# Patient Record
Sex: MALE | Race: WHITE | Employment: FULL TIME | URBAN - METROPOLITAN AREA
[De-identification: names, ages, dates, MRNs, and addresses within clinical notes are randomized per-mention and may not be internally consistent; named-entity substitution may affect disease eponyms.]

---

## 2022-01-11 ENCOUNTER — HOSPITAL ENCOUNTER (EMERGENCY)
Age: 72
Discharge: HOME | End: 2022-01-11
Attending: STUDENT IN AN ORGANIZED HEALTH CARE EDUCATION/TRAINING PROGRAM
Payer: COMMERCIAL

## 2022-01-11 VITALS
WEIGHT: 205 LBS | DIASTOLIC BLOOD PRESSURE: 74 MMHG | TEMPERATURE: 98.5 F | SYSTOLIC BLOOD PRESSURE: 132 MMHG | OXYGEN SATURATION: 97 % | RESPIRATION RATE: 18 BRPM | BODY MASS INDEX: 27.17 KG/M2 | HEIGHT: 73 IN | HEART RATE: 78 BPM

## 2022-01-11 DIAGNOSIS — U07.1 COVID: Primary | ICD-10-CM

## 2022-01-11 PROCEDURE — 74011000258 HC RX REV CODE- 258: Performed by: PHYSICIAN ASSISTANT

## 2022-01-11 PROCEDURE — 74011000636 HC RX REV CODE- 636: Performed by: PHYSICIAN ASSISTANT

## 2022-01-11 PROCEDURE — M0243 CASIRIVI AND IMDEVI INFUSION: HCPCS

## 2022-01-11 PROCEDURE — 99282 EMERGENCY DEPT VISIT SF MDM: CPT

## 2022-01-11 RX ORDER — GLIPIZIDE 10 MG/1
10 TABLET, FILM COATED, EXTENDED RELEASE ORAL 2 TIMES DAILY
COMMUNITY

## 2022-01-11 RX ORDER — METFORMIN HYDROCHLORIDE 850 MG/1
1000 TABLET ORAL 2 TIMES DAILY WITH MEALS
COMMUNITY
End: 2022-05-04 | Stop reason: CLARIF

## 2022-01-11 RX ORDER — PIOGLITAZONEHYDROCHLORIDE 15 MG/1
30 TABLET ORAL DAILY
COMMUNITY

## 2022-01-11 RX ADMIN — CASIRIVIMAB AND IMDEVIMAB 1200 MG: 600; 600 INJECTION, SOLUTION, CONCENTRATE INTRAVENOUS at 15:54

## 2022-01-11 NOTE — ED NOTES
Patient is Alert and oriented x 4. Patient stated that he was positive for COVID on 1/11/2022 at his PCP office. Patient complains of having shortness of breath, sore throat, and generalized body weakness. Patient appears well. NAD noted. Patient is here for infusion.

## 2022-01-11 NOTE — ED PROVIDER NOTES
[facility] EMERGENCY DEPT    Patient Name: Gita Anton    History of Presenting Illness     Chief Complaint   Patient presents with    Positive For Covid-19    IV Med     70 y.o. male presents to the ED requesting the monoclonal antibody. Patient states he developed symptoms 3 days ago, was tested positive by his doctor today. He notes having a sore throat as well as intermittent headaches. Patient has taken over-the-counter Tylenol with some improvement of his symptoms. Denies any fever, chills, cough, shortness of breath, other symptoms. Patient denies any other associated signs or symptoms. Patient denies any other complaints. Nursing notes regarding the HPI and triage nursing notes were reviewed. Prior medical records were reviewed. Current Outpatient Medications   Medication Sig Dispense Refill    pioglitazone (ACTOS) 15 mg tablet Take 15 mg by mouth daily.  metFORMIN (GLUCOPHAGE) 850 mg tablet Take 850 mg by mouth two (2) times daily (with meals). 1 tab in morning,  2 tabs in afternoon      glipiZIDE SR (GLUCOTROL XL) 10 mg CR tablet Take 10 mg by mouth two (2) times a day.  magic mouthwash (ROSE) susp Take 5 mL by mouth every four (4) hours as needed for Pain. Equal parts Maalox, lidocaine, Benadryl. 120 mL 0    amLODIPine-benazepril (LOTREL) 10-20 mg per capsule Take 1 Cap by mouth daily. 30 Cap 12    pantoprazole (PROTONIX) 40 mg tablet Take 40 mg by mouth daily.  metoprolol-XL (TOPROL XL) 50 mg XL tablet Take  by mouth daily.  aspirin delayed-release 81 mg tablet Take  by mouth daily.  omega-3 fatty acids (FISH OIL) cap Take  by mouth two (2) times a day.          Past History     Past Medical History:  Past Medical History:   Diagnosis Date    Cancer [facility]) 2006    prostate removed    Diabetes ([facility] [address]     Essential hypertension     Hypertension     Murmur     Murmur     Sciatica        Past Surgical History:  Past Surgical History: Procedure Laterality Date    HX KNEE ARTHROSCOPY      HX PARTIAL THYROIDECTOMY         Family History:  Family History   Problem Relation Age of Onset    Hypertension Mother     Hypertension Father     Diabetes Father     Hypertension Brother     Diabetes Maternal Grandmother        Social History:  Social History     Tobacco Use    Smoking status: Former Smoker     Packs/day: 0.50     Years: 5.00     Pack years: 2.50     Quit date: 3/13/1971     Years since quittin.8    Smokeless tobacco: Never Used   Substance Use Topics    Alcohol use: Yes     Alcohol/week: 0.8 standard drinks     Types: 1 Standard drinks or equivalent per week    Drug use: Never       Allergies:  No Known Allergies    Patient's primary care provider (as noted in EPIC):  Ortega Cheatham MD    Review of Systems   Constitutional:  Denies malaise, fever, chills. ENMT:  +sore throat. Cardiac:  Denies chest pain or palpitations. Respiratory:  Denies wheezing, difficulty breathing, shortness of breath. GI/ABD:  Denies injury, pain, distention, nausea, vomiting, diarrhea. Neuro: + headaches. Denies LOC, dizziness, neurologic symptoms/deficits/paresthesias. Skin: Denies injury, rash, itching or skin changes. All other systems negative as reviewed. Visit Vitals  /74   Pulse 78   Temp 98.5 °F (36.9 °C)   Resp 18   Ht 6' 1\" (1.854 m)   Wt 93 kg (205 lb)   SpO2 97%   BMI 27.05 kg/m²       PHYSICAL EXAM:    CONSTITUTIONAL:  Alert, in no apparent distress;  well developed;  well nourished. HEAD:  Normocephalic, atraumatic. EYES:  EOMI. Non-icteric sclera. Normal conjunctiva. ENTM:  Nose:  no rhinorrhea. Mouth: Mild erythema, without edema or exudate. Uvula midline, airway patent. NECK:  Supple. RESPIRATORY:  Chest clear, equal breath sounds, good air movement. Without wheezes, rhonchi or rales. CARDIOVASCULAR:  Regular rate and rhythm. No murmurs, rubs, or gallops.   GI:  Normal bowel sounds, abdomen soft and non-tender. No rebound or guarding. BACK:  Non-tender. NEURO:  Moves all four extremities, and grossly normal motor exam.  SKIN:  No rashes;  Normal for age. PSYCH:  Alert and normal affect. MEDICAL DECISION MAKING:  Patient given monoclonal antibody. He states that he has a sore throat, Rx provided for Magic mouthwash. He will take Tylenol, drink plenty of water, follow-up with his primary care doctor, return for any acute worsening including hemoptysis or shortness of breath. Diagnosis:   1. COVID      Disposition: Discharge    Follow-up Information     Follow up With Specialties Details Why Contact Info    Rajeev Shaikh MD Internal Medicine In 3 days  [place] [address] [address] [place] [address] [id number] [place] [address]      [id number] [place] [address] EMERGENCY DEPT Emergency Medicine  If symptoms worsen [address] [address]  [phone]          Patient's Medications   Start Taking    MAGIC MOUTHWASH Kiara Donaldson) SUSP    Take 5 mL by mouth every four (4) hours as needed for Pain. Equal parts Maalox, lidocaine, Benadryl. Continue Taking    AMLODIPINE-BENAZEPRIL (LOTREL) 10-20 MG PER CAPSULE    Take 1 Cap by mouth daily. ASPIRIN DELAYED-RELEASE 81 MG TABLET    Take  by mouth daily. GLIPIZIDE SR (GLUCOTROL XL) 10 MG CR TABLET    Take 10 mg by mouth two (2) times a day. METFORMIN (GLUCOPHAGE) 850 MG TABLET    Take 850 mg by mouth two (2) times daily (with meals). 1 tab in morning,  2 tabs in afternoon    METOPROLOL-XL (TOPROL XL) 50 MG XL TABLET    Take  by mouth daily. OMEGA-3 FATTY ACIDS (FISH OIL) CAP    Take  by mouth two (2) times a day. PANTOPRAZOLE (PROTONIX) 40 MG TABLET    Take 40 mg by mouth daily. PIOGLITAZONE (ACTOS) 15 MG TABLET    Take 15 mg by mouth daily. These Medications have changed    No medications on file   Stop Taking    METFORMIN (GLUCOPHAGE) 500 MG TABLET    Take 500 mg by mouth three (3) times daily (with meals).        Mariluz PROCTOR Partha Hickey [place]

## 2022-01-11 NOTE — ED NOTES
Patient remains A&Ox4 resp even and unlabored, NAD noted or indicated. VSS Rn to continue to monitor and maintain safety. Regen-COV given.

## 2022-01-11 NOTE — ED NOTES
Patient remains A&Ox4 resp even and unlabored, NAD noted or indicated. VSS Rn to continue to monitor and maintain safety.      Infusion completed

## 2022-01-12 ENCOUNTER — PATIENT OUTREACH (OUTPATIENT)
Dept: CASE MANAGEMENT | Age: 72
End: 2022-01-12

## 2022-01-12 NOTE — PROGRESS NOTES
Patient contacted regarding COVID-19 diagnosis. Discussed COVID-19 related testing which was not done at this time. Test results were not done. Patient informed of results, if available? n/a.     LPN Care Coordinator contacted the patient by telephone to perform post discharge assessment. Call within 2 business days of discharge: Yes Verified name and  with wife as identifiers. Provided introduction to self, and explanation of the LPN CC role, and reason for call due to risk factors for infection and/or exposure to COVID-19. Spoke with patient's wife. She states he has lost his voice. They have no questions or concerns. Due to no new or worsening symptoms encounter was not routed to provider for escalation. Discussed follow-up appointments. If no appointment was previously scheduled, appointment scheduling offered:  no. [facility] follow up appointment(s): No future appointments. [facility] follow up appointment(s): upcoming pcp appointment       Advance Care Planning:   Does patient have an Advance Directive: healthcare decision maker on file. Wife was given an opportunity to verbalize any questions and concerns and agrees to contact LPN CC or health care provider for questions related to their healthcare. LPN CC provided contact information. Plan for follow-up call in 5-7 days based on severity of symptoms and risk factors.

## 2022-01-21 ENCOUNTER — PATIENT OUTREACH (OUTPATIENT)
Dept: CASE MANAGEMENT | Age: 72
End: 2022-01-21

## 2022-01-21 NOTE — PROGRESS NOTES
Date/Time:  1/21/2022 11:16 AM   Call within 2 business days of discharge: Yes   Attempted to reach patient by telephone. Left HIPPA compliant message requesting a return call. This episode is resolved.

## 2022-03-31 ENCOUNTER — TRANSCRIBE ORDER (OUTPATIENT)
Dept: REGISTRATION | Age: 72
End: 2022-03-31

## 2022-03-31 ENCOUNTER — HOSPITAL ENCOUNTER (OUTPATIENT)
Dept: PREADMISSION TESTING | Age: 72
Discharge: HOME | End: 2022-03-31
Payer: COMMERCIAL

## 2022-03-31 ENCOUNTER — HOSPITAL ENCOUNTER (OUTPATIENT)
Dept: PREADMISSION TESTING | Age: 72
Discharge: HOME | End: 2022-03-31

## 2022-03-31 VITALS — WEIGHT: 208.67 LBS | HEIGHT: 73 IN | BODY MASS INDEX: 27.66 KG/M2

## 2022-03-31 DIAGNOSIS — M48.061 SPINAL STENOSIS, LUMBAR REGION, WITHOUT NEUROGENIC CLAUDICATION: ICD-10-CM

## 2022-03-31 DIAGNOSIS — Z01.812 BLOOD TESTS PRIOR TO TREATMENT OR PROCEDURE: ICD-10-CM

## 2022-03-31 DIAGNOSIS — M48.061 SPINAL STENOSIS, LUMBAR REGION, WITHOUT NEUROGENIC CLAUDICATION: Primary | ICD-10-CM

## 2022-03-31 LAB
ALBUMIN SERPL-MCNC: 3.7 G/DL (ref 3.4–5)
ALBUMIN/GLOB SERPL: 1 {RATIO} (ref 0.8–1.7)
ALP SERPL-CCNC: 84 U/L (ref 45–117)
ALT SERPL-CCNC: 36 U/L (ref 16–61)
ANION GAP SERPL CALC-SCNC: 3 MMOL/L (ref 3–18)
APTT PPP: 28.7 SEC (ref 23–36.4)
AST SERPL-CCNC: 16 U/L (ref 10–38)
ATRIAL RATE: 71 BPM
BASOPHILS # BLD: 0.1 K/UL (ref 0–0.1)
BASOPHILS NFR BLD: 1 % (ref 0–2)
BILIRUB SERPL-MCNC: 0.8 MG/DL (ref 0.2–1)
BUN SERPL-MCNC: 21 MG/DL (ref 7–18)
BUN/CREAT SERPL: 24 (ref 12–20)
CALCIUM SERPL-MCNC: 9.7 MG/DL (ref 8.5–10.1)
CALCULATED P AXIS, ECG09: 71 DEGREES
CALCULATED R AXIS, ECG10: 46 DEGREES
CALCULATED T AXIS, ECG11: 67 DEGREES
CHLORIDE SERPL-SCNC: 107 MMOL/L (ref 100–111)
CO2 SERPL-SCNC: 29 MMOL/L (ref 21–32)
CREAT SERPL-MCNC: 0.87 MG/DL (ref 0.6–1.3)
DIAGNOSIS, 93000: NORMAL
DIFFERENTIAL METHOD BLD: NORMAL
EOSINOPHIL # BLD: 0.2 K/UL (ref 0–0.4)
EOSINOPHIL NFR BLD: 3 % (ref 0–5)
ERYTHROCYTE [DISTWIDTH] IN BLOOD BY AUTOMATED COUNT: 12.6 % (ref 11.6–14.5)
EST. AVERAGE GLUCOSE BLD GHB EST-MCNC: 212 MG/DL
GLOBULIN SER CALC-MCNC: 3.6 G/DL (ref 2–4)
GLUCOSE SERPL-MCNC: 191 MG/DL (ref 74–99)
HBA1C MFR BLD: 9 % (ref 4.2–5.6)
HCT VFR BLD AUTO: 43 % (ref 36–48)
HGB BLD-MCNC: 14 G/DL (ref 13–16)
IMM GRANULOCYTES # BLD AUTO: 0 K/UL (ref 0–0.04)
IMM GRANULOCYTES NFR BLD AUTO: 0 % (ref 0–0.5)
INR PPP: 0.9 (ref 0.8–1.2)
LYMPHOCYTES # BLD: 1.5 K/UL (ref 0.9–3.6)
LYMPHOCYTES NFR BLD: 25 % (ref 21–52)
MCH RBC QN AUTO: 28.3 PG (ref 24–34)
MCHC RBC AUTO-ENTMCNC: 32.6 G/DL (ref 31–37)
MCV RBC AUTO: 86.9 FL (ref 78–100)
MONOCYTES # BLD: 0.5 K/UL (ref 0.05–1.2)
MONOCYTES NFR BLD: 9 % (ref 3–10)
NEUTS SEG # BLD: 3.7 K/UL (ref 1.8–8)
NEUTS SEG NFR BLD: 62 % (ref 40–73)
NRBC # BLD: 0 K/UL (ref 0–0.01)
NRBC BLD-RTO: 0 PER 100 WBC
P-R INTERVAL, ECG05: 170 MS
PLATELET # BLD AUTO: 214 K/UL (ref 135–420)
PMV BLD AUTO: 10 FL (ref 9.2–11.8)
POTASSIUM SERPL-SCNC: 5 MMOL/L (ref 3.5–5.5)
PROT SERPL-MCNC: 7.3 G/DL (ref 6.4–8.2)
PROTHROMBIN TIME: 12 SEC (ref 11.5–15.2)
Q-T INTERVAL, ECG07: 406 MS
QRS DURATION, ECG06: 82 MS
QTC CALCULATION (BEZET), ECG08: 441 MS
RBC # BLD AUTO: 4.95 M/UL (ref 4.35–5.65)
SODIUM SERPL-SCNC: 139 MMOL/L (ref 136–145)
VENTRICULAR RATE, ECG03: 71 BPM
WBC # BLD AUTO: 5.9 K/UL (ref 4.6–13.2)

## 2022-03-31 PROCEDURE — 85730 THROMBOPLASTIN TIME PARTIAL: CPT

## 2022-03-31 PROCEDURE — 83036 HEMOGLOBIN GLYCOSYLATED A1C: CPT

## 2022-03-31 PROCEDURE — 85610 PROTHROMBIN TIME: CPT

## 2022-03-31 PROCEDURE — 80053 COMPREHEN METABOLIC PANEL: CPT

## 2022-03-31 PROCEDURE — 85025 COMPLETE CBC W/AUTO DIFF WBC: CPT

## 2022-03-31 PROCEDURE — 93005 ELECTROCARDIOGRAM TRACING: CPT

## 2022-03-31 PROCEDURE — 36415 COLL VENOUS BLD VENIPUNCTURE: CPT

## 2022-03-31 RX ORDER — CEFAZOLIN SODIUM/WATER 2 G/20 ML
2 SYRINGE (ML) INTRAVENOUS ONCE
Status: CANCELLED | OUTPATIENT
Start: 2022-03-31 | End: 2022-03-31

## 2022-03-31 RX ORDER — SODIUM CHLORIDE, SODIUM LACTATE, POTASSIUM CHLORIDE, CALCIUM CHLORIDE 600; 310; 30; 20 MG/100ML; MG/100ML; MG/100ML; MG/100ML
125 INJECTION, SOLUTION INTRAVENOUS CONTINUOUS
Status: CANCELLED | OUTPATIENT
Start: 2022-03-31

## 2022-03-31 NOTE — PERIOP NOTES
No sleep apnea, removable prosthetic devices or family history of malignant hyperthermia. Care fusion kit and instructions given and reviewed. PCP is aware of the surgery. No participation in clinical trial or research study. Do not bring any valuables on DOS- jewelry, wallet, cash, laptop, medications. Does not meet criteria for special population at this time. Possible time delay day of surgery reviewed. Covid (media)  DNR status-none. To stop fish oil and aspirin per MD. Neck 19 in. Patient reports had POSITIVE Covid 19 test on:  Feb 2022    Patient reports symptoms as:  [] only mild, non-respiratory symptoms  [] symptomatic (e.g., cough, dyspnea) but did not require hospitalization. [] symptomatic and is diabetic, immunocompromised, or was hospitalized  [] was admitted to an intensive care unit due to COVID-19 infection         Sore throat, felt miserable- Received infusion 2-.     Patient reports respiratory symptoms resolved on: 2-

## 2022-04-01 LAB
BACTERIA SPEC CULT: NORMAL
BACTERIA SPEC CULT: NORMAL
SERVICE CMNT-IMP: NORMAL

## 2022-04-06 NOTE — NURSE NAVIGATOR
Elaine Loya watched the preoperative spine seminar online and received a preoperative spine education book in anticipation of surgery.      Nurse Navigator

## 2022-04-07 PROBLEM — M48.061 LUMBAR SPINAL STENOSIS: Status: ACTIVE | Noted: 2022-04-07

## 2022-04-07 PROBLEM — M47.816 LUMBAR SPONDYLOSIS: Status: ACTIVE | Noted: 2022-04-07

## 2022-04-07 PROBLEM — M54.10 RADICULOPATHY OF LEG: Status: ACTIVE | Noted: 2022-04-07

## 2022-04-07 NOTE — H&P
History and Physical        Patient: Matty Leung               Sex: male          DOA: (Not on file)         YOB: 1950      Age:  70 y.o.        LOS:  LOS: 0 days        HPI:     Matty Leung is a 70 y.o. male who has a history of back and lower extremity pain. Their pain is typically across the lower back and travels into the bilaterally lower extremity down the posterior aspect of the leg. He has numbness/tingling in the same distribution of their pain. He denies weakness in the bilateral lower extremity. Their pain is worse with ambulation and better at rest. He has failed conservative care including anti-inflammatories, analgesics, physical therapy and injections. Their pain affects their activities of daily living and would like to move forward with surgical intervention. Past Medical History:   Diagnosis Date    Cancer [facility]) 2006    prostate removed    Diabetes ([facility] [address]     Essential hypertension     Hypertension     Murmur     Sciatica        Past Surgical History:   Procedure Laterality Date    HX GI      colonoscopy    HX KNEE ARTHROSCOPY Left     HX PARTIAL THYROIDECTOMY  1998    parathyroid       Family History   Problem Relation Age of Onset    Hypertension Mother     Hypertension Father     Diabetes Father     Hypertension Brother     Diabetes Maternal Grandmother        Social History     Socioeconomic History    Marital status:    Tobacco Use    Smoking status: Former Smoker     Packs/day: 0.50     Years: 5.00     Pack years: 2.50     Quit date: 3/13/1971     Years since quittin.1    Smokeless tobacco: Never Used   Substance and Sexual Activity    Alcohol use: Yes     Alcohol/week: 3.0 standard drinks     Types: 1 Standard drinks or equivalent, 2 Cans of beer per week    Drug use: Never       Prior to Admission medications    Medication Sig Start Date End Date Taking?  Authorizing Provider   pioglitazone (ACTOS) 15 mg tablet Take 30 mg by mouth daily. Indications: type 2 diabetes mellitus    Other, MD [place]   metFORMIN (GLUCOPHAGE) 850 mg tablet Take 1,000 mg by mouth two (2) times daily (with meals). 1 tab in morning,  2 tabs in afternoon   Indications: type 2 diabetes mellitus    Other, MD [place]   glipiZIDE SR (GLUCOTROL XL) 10 mg CR tablet Take 10 mg by mouth two (2) times a day. Indications: type 2 diabetes mellitus    Other, MD [place]   amLODIPine-benazepril (LOTREL) 10-20 mg per capsule Take 1 Cap by mouth daily. Patient taking differently: Take 1 Capsule by mouth daily. Indications: high blood pressure 4/18/13   Jeff Miller MD   aspirin delayed-release 81 mg tablet Take  by mouth daily. Provider, Historical   omega-3 fatty acids (FISH OIL) cap Take  by mouth daily. Provider, Historical   pantoprazole (PROTONIX) 40 mg tablet Take 40 mg by mouth daily. Indications: gastroesophageal reflux disease    Provider, Historical   metoprolol-XL (TOPROL XL) 50 mg XL tablet Take 50 mg by mouth daily. Indications: high blood pressure    Provider, Historical       No Known Allergies    Review of Systems  A comprehensive review of systems was negative except for that written in the History of Present Illness. Physical Exam:      There were no vitals taken for this visit. Physical Exam:  General: Alert and Oriented X 3  Lungs: Clear to ausculation bilaterally  Cardiovascular: Regular Rate and Rhythm, without murmur  Abdomen: Soft, nontender with positive bowel sounds in all four quadrants  Gential/Rectal: deferred  Musculoskeletal: The paitent has full range of motion of the lumbar spine in flexion, extension and side bending bilaterally. The patient has pain with flexion. There is not pain with internal and external rotation of the bilaterally hip. The patient is tender across the left paralumbar, right paralumbar muscles. The patient is neurologically intact in the lower extremities.   There is a Negative straight leg raise. His pulses are 2+. Calves are nontender. Radiographic evaluation show Lumbar DDD at L5-S1      Labs Reviewed: All lab results for the last 24 hours reviewed. Assessment/Plan     Principal Problem:    Lumbar spinal stenosis (4/7/2022)    Active Problems:    Lumbar spondylosis (4/7/2022)      Radiculopathy of leg (4/7/2022)        We are going to move forward with a decompressive lumbar laminectomy at L5-S1. The risks vs the benefits have been discussed with the patient. They will follow-up with us in the hospital 10 days post-operatively and call with any and all concerns.

## 2022-04-15 ENCOUNTER — ANESTHESIA EVENT (OUTPATIENT)
Dept: SURGERY | Age: 72
End: 2022-04-15
Payer: COMMERCIAL

## 2022-04-18 ENCOUNTER — APPOINTMENT (OUTPATIENT)
Dept: GENERAL RADIOLOGY | Age: 72
End: 2022-04-18
Attending: ORTHOPAEDIC SURGERY
Payer: COMMERCIAL

## 2022-04-18 ENCOUNTER — HOME HEALTH ADMISSION (OUTPATIENT)
Dept: HOME HEALTH SERVICES | Facility: HOME HEALTH | Age: 72
End: 2022-04-18
Payer: COMMERCIAL

## 2022-04-18 ENCOUNTER — HOSPITAL ENCOUNTER (OUTPATIENT)
Age: 72
Setting detail: OUTPATIENT SURGERY
Discharge: HOME | End: 2022-04-18
Attending: ORTHOPAEDIC SURGERY | Admitting: ORTHOPAEDIC SURGERY
Payer: COMMERCIAL

## 2022-04-18 ENCOUNTER — ANESTHESIA (OUTPATIENT)
Dept: SURGERY | Age: 72
End: 2022-04-18
Payer: COMMERCIAL

## 2022-04-18 VITALS
SYSTOLIC BLOOD PRESSURE: 140 MMHG | DIASTOLIC BLOOD PRESSURE: 65 MMHG | WEIGHT: 205.2 LBS | BODY MASS INDEX: 27.2 KG/M2 | TEMPERATURE: 97.5 F | OXYGEN SATURATION: 99 % | HEIGHT: 73 IN | RESPIRATION RATE: 16 BRPM | HEART RATE: 74 BPM

## 2022-04-18 DIAGNOSIS — M48.062 SPINAL STENOSIS OF LUMBAR REGION WITH NEUROGENIC CLAUDICATION: Primary | ICD-10-CM

## 2022-04-18 LAB
GLUCOSE BLD STRIP.AUTO-MCNC: 158 MG/DL (ref 70–110)
GLUCOSE BLD STRIP.AUTO-MCNC: 195 MG/DL (ref 70–110)
GLUCOSE BLD STRIP.AUTO-MCNC: 98 MG/DL (ref 70–110)

## 2022-04-18 PROCEDURE — 82962 GLUCOSE BLOOD TEST: CPT

## 2022-04-18 PROCEDURE — 74011250636 HC RX REV CODE- 250/636: Performed by: ANESTHESIOLOGY

## 2022-04-18 PROCEDURE — 77030003666 HC NDL SPINAL BD -A: Performed by: ORTHOPAEDIC SURGERY

## 2022-04-18 PROCEDURE — 76210000026 HC REC RM PH II 1 TO 1.5 HR: Performed by: ORTHOPAEDIC SURGERY

## 2022-04-18 PROCEDURE — 74011250636 HC RX REV CODE- 250/636: Performed by: ORTHOPAEDIC SURGERY

## 2022-04-18 PROCEDURE — 74011000250 HC RX REV CODE- 250: Performed by: ANESTHESIOLOGY

## 2022-04-18 PROCEDURE — 74011636637 HC RX REV CODE- 636/637: Performed by: ANESTHESIOLOGY

## 2022-04-18 PROCEDURE — 77030003029 HC SUT VCRL J&J -B: Performed by: ORTHOPAEDIC SURGERY

## 2022-04-18 PROCEDURE — 76210000006 HC OR PH I REC 0.5 TO 1 HR: Performed by: ORTHOPAEDIC SURGERY

## 2022-04-18 PROCEDURE — 77030020782 HC GWN BAIR PAWS FLX 3M -B: Performed by: ORTHOPAEDIC SURGERY

## 2022-04-18 PROCEDURE — 77030004435 HC BUR RND STRY -C: Performed by: ORTHOPAEDIC SURGERY

## 2022-04-18 PROCEDURE — 74011250637 HC RX REV CODE- 250/637: Performed by: ANESTHESIOLOGY

## 2022-04-18 PROCEDURE — 2709999900 HC NON-CHARGEABLE SUPPLY: Performed by: ORTHOPAEDIC SURGERY

## 2022-04-18 PROCEDURE — 77030013708 HC HNDPC SUC IRR PULS STRY –B: Performed by: ORTHOPAEDIC SURGERY

## 2022-04-18 PROCEDURE — 77030010507 HC ADH SKN DERMBND J&J -B: Performed by: ORTHOPAEDIC SURGERY

## 2022-04-18 PROCEDURE — 77030033138 HC SUT PGA STRATFX J&J -B: Performed by: ORTHOPAEDIC SURGERY

## 2022-04-18 PROCEDURE — 77030040361 HC SLV COMPR DVT MDII -B: Performed by: ORTHOPAEDIC SURGERY

## 2022-04-18 PROCEDURE — 74011000250 HC RX REV CODE- 250: Performed by: ORTHOPAEDIC SURGERY

## 2022-04-18 PROCEDURE — 77030018390 HC SPNG HEMSTAT2 J&J -B: Performed by: ORTHOPAEDIC SURGERY

## 2022-04-18 PROCEDURE — 76060000034 HC ANESTHESIA 1.5 TO 2 HR: Performed by: ORTHOPAEDIC SURGERY

## 2022-04-18 PROCEDURE — 76010000153 HC OR TIME 1.5 TO 2 HR: Performed by: ORTHOPAEDIC SURGERY

## 2022-04-18 RX ORDER — CEFAZOLIN SODIUM/WATER 2 G/20 ML
2 SYRINGE (ML) INTRAVENOUS ONCE
Status: COMPLETED | OUTPATIENT
Start: 2022-04-18 | End: 2022-04-18

## 2022-04-18 RX ORDER — LIDOCAINE HYDROCHLORIDE 20 MG/ML
INJECTION, SOLUTION EPIDURAL; INFILTRATION; INTRACAUDAL; PERINEURAL AS NEEDED
Status: DISCONTINUED | OUTPATIENT
Start: 2022-04-18 | End: 2022-04-18 | Stop reason: HOSPADM

## 2022-04-18 RX ORDER — DIPHENHYDRAMINE HYDROCHLORIDE 50 MG/ML
12.5 INJECTION, SOLUTION INTRAMUSCULAR; INTRAVENOUS
Status: DISCONTINUED | OUTPATIENT
Start: 2022-04-18 | End: 2022-04-18 | Stop reason: HOSPADM

## 2022-04-18 RX ORDER — SODIUM CHLORIDE 0.9 % (FLUSH) 0.9 %
5-40 SYRINGE (ML) INJECTION EVERY 8 HOURS
Status: DISCONTINUED | OUTPATIENT
Start: 2022-04-18 | End: 2022-04-18 | Stop reason: HOSPADM

## 2022-04-18 RX ORDER — FENTANYL CITRATE 50 UG/ML
25 INJECTION, SOLUTION INTRAMUSCULAR; INTRAVENOUS AS NEEDED
Status: DISCONTINUED | OUTPATIENT
Start: 2022-04-18 | End: 2022-04-18 | Stop reason: HOSPADM

## 2022-04-18 RX ORDER — SODIUM CHLORIDE, SODIUM LACTATE, POTASSIUM CHLORIDE, CALCIUM CHLORIDE 600; 310; 30; 20 MG/100ML; MG/100ML; MG/100ML; MG/100ML
125 INJECTION, SOLUTION INTRAVENOUS CONTINUOUS
Status: DISCONTINUED | OUTPATIENT
Start: 2022-04-18 | End: 2022-04-18 | Stop reason: HOSPADM

## 2022-04-18 RX ORDER — HYDROMORPHONE HYDROCHLORIDE 1 MG/ML
0.5 INJECTION, SOLUTION INTRAMUSCULAR; INTRAVENOUS; SUBCUTANEOUS
Status: DISCONTINUED | OUTPATIENT
Start: 2022-04-18 | End: 2022-04-18 | Stop reason: HOSPADM

## 2022-04-18 RX ORDER — CEPHALEXIN 500 MG/1
500 CAPSULE ORAL 4 TIMES DAILY
Qty: 28 CAPSULE | Refills: 0 | Status: SHIPPED | OUTPATIENT
Start: 2022-04-18 | End: 2022-04-25

## 2022-04-18 RX ORDER — OXYCODONE HYDROCHLORIDE 5 MG/1
5 TABLET ORAL
Status: COMPLETED | OUTPATIENT
Start: 2022-04-18 | End: 2022-04-18

## 2022-04-18 RX ORDER — FENTANYL CITRATE 50 UG/ML
INJECTION, SOLUTION INTRAMUSCULAR; INTRAVENOUS AS NEEDED
Status: DISCONTINUED | OUTPATIENT
Start: 2022-04-18 | End: 2022-04-18 | Stop reason: HOSPADM

## 2022-04-18 RX ORDER — INSULIN LISPRO 100 [IU]/ML
INJECTION, SOLUTION INTRAVENOUS; SUBCUTANEOUS ONCE
Status: COMPLETED | OUTPATIENT
Start: 2022-04-18 | End: 2022-04-18

## 2022-04-18 RX ORDER — MIDAZOLAM HYDROCHLORIDE 1 MG/ML
INJECTION, SOLUTION INTRAMUSCULAR; INTRAVENOUS AS NEEDED
Status: DISCONTINUED | OUTPATIENT
Start: 2022-04-18 | End: 2022-04-18 | Stop reason: HOSPADM

## 2022-04-18 RX ORDER — HYDROCODONE BITARTRATE AND ACETAMINOPHEN 5; 325 MG/1; MG/1
1 TABLET ORAL
Qty: 28 TABLET | Refills: 0 | Status: SHIPPED | OUTPATIENT
Start: 2022-04-18 | End: 2022-04-25

## 2022-04-18 RX ORDER — PROPOFOL 10 MG/ML
INJECTION, EMULSION INTRAVENOUS AS NEEDED
Status: DISCONTINUED | OUTPATIENT
Start: 2022-04-18 | End: 2022-04-18 | Stop reason: HOSPADM

## 2022-04-18 RX ORDER — SODIUM CHLORIDE 0.9 % (FLUSH) 0.9 %
5-40 SYRINGE (ML) INJECTION AS NEEDED
Status: DISCONTINUED | OUTPATIENT
Start: 2022-04-18 | End: 2022-04-18 | Stop reason: HOSPADM

## 2022-04-18 RX ORDER — PROCHLORPERAZINE EDISYLATE 5 MG/ML
5 INJECTION INTRAMUSCULAR; INTRAVENOUS ONCE
Status: DISCONTINUED | OUTPATIENT
Start: 2022-04-18 | End: 2022-04-18 | Stop reason: HOSPADM

## 2022-04-18 RX ORDER — SODIUM CHLORIDE, SODIUM LACTATE, POTASSIUM CHLORIDE, CALCIUM CHLORIDE 600; 310; 30; 20 MG/100ML; MG/100ML; MG/100ML; MG/100ML
100 INJECTION, SOLUTION INTRAVENOUS CONTINUOUS
Status: DISCONTINUED | OUTPATIENT
Start: 2022-04-18 | End: 2022-04-18 | Stop reason: HOSPADM

## 2022-04-18 RX ORDER — HYDROCODONE BITARTRATE AND ACETAMINOPHEN 5; 325 MG/1; MG/1
1 TABLET ORAL
Status: DISCONTINUED | OUTPATIENT
Start: 2022-04-18 | End: 2022-04-18 | Stop reason: HOSPADM

## 2022-04-18 RX ORDER — GLYCOPYRROLATE 0.2 MG/ML
INJECTION INTRAMUSCULAR; INTRAVENOUS AS NEEDED
Status: DISCONTINUED | OUTPATIENT
Start: 2022-04-18 | End: 2022-04-18 | Stop reason: HOSPADM

## 2022-04-18 RX ORDER — ALBUTEROL SULFATE 0.83 MG/ML
2.5 SOLUTION RESPIRATORY (INHALATION)
Status: DISCONTINUED | OUTPATIENT
Start: 2022-04-18 | End: 2022-04-18 | Stop reason: HOSPADM

## 2022-04-18 RX ORDER — NALOXONE HYDROCHLORIDE 0.4 MG/ML
0.1 INJECTION, SOLUTION INTRAMUSCULAR; INTRAVENOUS; SUBCUTANEOUS AS NEEDED
Status: DISCONTINUED | OUTPATIENT
Start: 2022-04-18 | End: 2022-04-18 | Stop reason: HOSPADM

## 2022-04-18 RX ORDER — DEXAMETHASONE SODIUM PHOSPHATE 4 MG/ML
INJECTION, SOLUTION INTRA-ARTICULAR; INTRALESIONAL; INTRAMUSCULAR; INTRAVENOUS; SOFT TISSUE AS NEEDED
Status: DISCONTINUED | OUTPATIENT
Start: 2022-04-18 | End: 2022-04-18 | Stop reason: HOSPADM

## 2022-04-18 RX ORDER — ONDANSETRON 2 MG/ML
INJECTION INTRAMUSCULAR; INTRAVENOUS AS NEEDED
Status: DISCONTINUED | OUTPATIENT
Start: 2022-04-18 | End: 2022-04-18 | Stop reason: HOSPADM

## 2022-04-18 RX ORDER — PHENYLEPHRINE HCL IN 0.9% NACL 1 MG/10 ML
SYRINGE (ML) INTRAVENOUS AS NEEDED
Status: DISCONTINUED | OUTPATIENT
Start: 2022-04-18 | End: 2022-04-18 | Stop reason: HOSPADM

## 2022-04-18 RX ORDER — ROCURONIUM BROMIDE 10 MG/ML
INJECTION, SOLUTION INTRAVENOUS AS NEEDED
Status: DISCONTINUED | OUTPATIENT
Start: 2022-04-18 | End: 2022-04-18 | Stop reason: HOSPADM

## 2022-04-18 RX ORDER — KETAMINE HYDROCHLORIDE 10 MG/ML
INJECTION, SOLUTION INTRAMUSCULAR; INTRAVENOUS AS NEEDED
Status: DISCONTINUED | OUTPATIENT
Start: 2022-04-18 | End: 2022-04-18 | Stop reason: HOSPADM

## 2022-04-18 RX ADMIN — ONDANSETRON HYDROCHLORIDE 4 MG: 2 INJECTION INTRAMUSCULAR; INTRAVENOUS at 12:31

## 2022-04-18 RX ADMIN — SODIUM CHLORIDE, SODIUM LACTATE, POTASSIUM CHLORIDE, AND CALCIUM CHLORIDE: 600; 310; 30; 20 INJECTION, SOLUTION INTRAVENOUS at 12:25

## 2022-04-18 RX ADMIN — SODIUM CHLORIDE, SODIUM LACTATE, POTASSIUM CHLORIDE, AND CALCIUM CHLORIDE: 600; 310; 30; 20 INJECTION, SOLUTION INTRAVENOUS at 11:21

## 2022-04-18 RX ADMIN — SODIUM CHLORIDE, SODIUM LACTATE, POTASSIUM CHLORIDE, AND CALCIUM CHLORIDE 125 ML/HR: 600; 310; 30; 20 INJECTION, SOLUTION INTRAVENOUS at 09:57

## 2022-04-18 RX ADMIN — DEXAMETHASONE SODIUM PHOSPHATE 4 MG: 4 INJECTION, SOLUTION INTRAMUSCULAR; INTRAVENOUS at 12:31

## 2022-04-18 RX ADMIN — GLYCOPYRROLATE 0.2 MG: 0.2 INJECTION INTRAMUSCULAR; INTRAVENOUS at 11:21

## 2022-04-18 RX ADMIN — Medication 200 MCG: at 12:32

## 2022-04-18 RX ADMIN — FENTANYL CITRATE 100 MCG: 50 INJECTION, SOLUTION INTRAMUSCULAR; INTRAVENOUS at 11:21

## 2022-04-18 RX ADMIN — Medication 100 MCG: at 12:26

## 2022-04-18 RX ADMIN — SUGAMMADEX 200 MG: 100 INJECTION, SOLUTION INTRAVENOUS at 12:44

## 2022-04-18 RX ADMIN — Medication 100 MCG: at 12:06

## 2022-04-18 RX ADMIN — MIDAZOLAM 2 MG: 1 INJECTION INTRAMUSCULAR; INTRAVENOUS at 11:21

## 2022-04-18 RX ADMIN — PROPOFOL 100 MG: 10 INJECTION, EMULSION INTRAVENOUS at 11:40

## 2022-04-18 RX ADMIN — KETAMINE HYDROCHLORIDE 30 MG: 10 INJECTION, SOLUTION INTRAMUSCULAR; INTRAVENOUS at 11:35

## 2022-04-18 RX ADMIN — Medication 2 G: at 11:36

## 2022-04-18 RX ADMIN — ROCURONIUM BROMIDE 70 MG: 10 INJECTION, SOLUTION INTRAVENOUS at 11:40

## 2022-04-18 RX ADMIN — FENTANYL CITRATE 25 MCG: 50 INJECTION, SOLUTION INTRAMUSCULAR; INTRAVENOUS at 13:34

## 2022-04-18 RX ADMIN — LIDOCAINE HYDROCHLORIDE 100 MG: 20 INJECTION, SOLUTION INTRAVENOUS at 11:40

## 2022-04-18 RX ADMIN — SODIUM CHLORIDE, SODIUM LACTATE, POTASSIUM CHLORIDE, AND CALCIUM CHLORIDE: 600; 310; 30; 20 INJECTION, SOLUTION INTRAVENOUS at 11:51

## 2022-04-18 RX ADMIN — INSULIN LISPRO 3 UNITS: 100 INJECTION, SOLUTION INTRAVENOUS; SUBCUTANEOUS at 10:09

## 2022-04-18 RX ADMIN — KETAMINE HYDROCHLORIDE 20 MG: 10 INJECTION, SOLUTION INTRAMUSCULAR; INTRAVENOUS at 12:31

## 2022-04-18 RX ADMIN — OXYCODONE 5 MG: 5 TABLET ORAL at 15:09

## 2022-04-18 NOTE — PERIOP NOTES
Reviewed PTA medication list with patient/caregiver and patient/caregiver denies any additional medications. Patient admits to having a responsible adult care for them at home for at least 24 hours after surgery. Patient encouraged to use gown warming system and informed that using said warming gown to regulate body temperature prior to a procedure has been shown to help reduce the risks of blood clots and infection. Patient's pharmacy of choice verified and documented in PTA medication section. Dual skin assessment & fall risk band verification completed with Reba Ponce RN.

## 2022-04-18 NOTE — ANESTHESIA POSTPROCEDURE EVALUATION
Procedure(s):  LUMBAR 5-SACRAL 1 LAMINECTOMY W/C-ARM. general    Anesthesia Post Evaluation      Multimodal analgesia: multimodal analgesia used between 6 hours prior to anesthesia start to PACU discharge  Patient location during evaluation: PACU  Patient participation: complete - patient cannot participate  Level of consciousness: responsive to verbal stimuli  Airway patency: patent  Anesthetic complications: no  Cardiovascular status: acceptable  Respiratory status: acceptable  Hydration status: acceptable  Post anesthesia nausea and vomiting:  none  Final Post Anesthesia Temperature Assessment:  Normothermia (36.0-37.5 degrees C)      INITIAL Post-op Vital signs:   Vitals Value Taken Time   BP     Temp     Pulse 76 04/18/22 1303   Resp 14 04/18/22 1303   SpO2 100 % 04/18/22 1303   Vitals shown include unvalidated device data.

## 2022-04-18 NOTE — DISCHARGE INSTRUCTIONS
[facility]  Dr. Melina Martinez Post-Operative Instructions Lumbar Laminectomy and Diskectomy    ACTIVITIES :  *The first week after surgery   1. You may be up and walking about the house. 2.  Activities around the house, such as washing dishes, fixing light meals, and your own personal care are fine. 3.  Avoid strenuous activities, such as vacuuming, lifting laundry or grocery bags. 4.  Do not lift anything heavier than 1 gallon of milk (or about 5-8 pounds). *Week 2 and beyond  1. You may gradually increase your activities, but still avoid heavy lifting, pushing/pulling. 2.  Walking is the best way to rebuild strength and stamina. Start SLOWLY and gradually increase the distance a little every week. 3.  Walk at a pace that avoids fatigue or severe pain. Do not try to walk several blocks the first day! As you increase the distance, you may feel tired. If so, stop and rest.   4.  You should be able to walk several blocks by your first clinic visit. 5.  Follow-up with Dr. Danielle More in 10-14 days. BATHING and INCISION CARE:  1. The incision may be tender to touch or feel numb: this is normal.   2.  Keep the incision clean and dry no showering until your follow-up appointment. The incision will be closed with sutures under the skin and the skin will be glued. 3.  Do not apply any lotions, ointments or oils on the incision. 4.  If you notice any excessive swelling, redness, or persistent drainage around the incision, notify the office immediately. DRIVIN. You should not drive until after your follow-up appointment. 2.  You can be in a vehicle for short distances, but if you travel any long distance, please stop about every 30 minutes and walk/stretch. 3.  You should NEVER drive while taking narcotic medication. RETURN TO WORK :  1. The decision to return to work will be determined on an individual basis.    2.  Many people who have a strenuous job (construction, heavy labor, etc) may need to be off work for up to 4 weeks. 3.  If you need a work note, please let us know as soon as possible, and not the same day you are planning to return to work. NUTRITION :  1.  Good nutrition is an essential part of healing. 2.  You should eat a balanced diet each day, including fruits, vegetables, dairy products and protein. 3.  Remember to drink plenty of water. 4.  If you have not had a bowel movement within 3 days of surgery, you will need to use a laxative or suppository that can be obtained over-the-counter at your local pharmacy. MEDICATIONS -  1. You may resume the medications you were taking before surgery, with the general exception of (or DO NOT TAKE) non-steroidal anti-inflammatory medications, such as Motrin, Aleve, Advil Naprosyn, Ibuprofen or aspirin for 5 days after surgery. 2.  You will receive a prescription for pain medication at discharge from the hospital. The pain medication works best if taken before the pain becomes severe. 3.  To reduce stomach upset, always take the medication with food. 4.  Begin to wean yourself off the pain medication during the second week after discharge. 5.  If you need a refill, please call the office during working hours at least 2 days before your prescription runs out. Do not wait until your bottle is empty to call for a refill. 6.  DO NOT drive if you are taking narcotic pain medications. HOME HEALTH CARE:  1.   A home health care service has been set-up for you to help assist you once you leave the hospital.  2.  They will contact you either before you leave the hospital or within 24 hours once you have been discharged home. 3. A nurse will assist you with your dressing changes and a Physical Therapist with help you with your therapy needs.     CALL THE OFFICE:   If you have severe pain unrelieved by the medications, new numbness or tingling in your legs;   If you have a fever of 101.0°F or greater;    If you notice excessive swelling, redness, or persistent drainage from the incision or IV site; [facility] office number is [phone] from 8:00am to 5:00pm Monday through Friday. After 5:00pm, on weekends, or holidays, please leave a message with our answering service and the doctor on-call will get back to you shortly. DISCHARGE SUMMARY from Nurse    PATIENT INSTRUCTIONS:    After general anesthesia or intravenous sedation, for 24 hours or while taking prescription Narcotics:  · Limit your activities  · Do not drive and operate hazardous machinery  · Do not make important personal or business decisions  · Do  not drink alcoholic beverages  · If you have not urinated within 8 hours after discharge, please contact your surgeon on call. Report the following to your surgeon:  · Excessive pain, swelling, redness or odor of or around the surgical area  · Temperature over 100.5  · Nausea and vomiting lasting longer than 4 hours or if unable to take medications  · Any signs of decreased circulation or nerve impairment to extremity: change in color, persistent  numbness, tingling, coldness or increase pain  · Any questions    What to do at Home:  Recommended activity: Ambulate in house and No lifting, Driving, or Strenuous exercise until advised,     If you experience any of the following symptoms above, please follow up with Dr. Nga Raza. *  Please give a list of your current medications to your Primary Care Provider. *  Please update this list whenever your medications are discontinued, doses are      changed, or new medications (including over-the-counter products) are added. *  Please carry medication information at all times in case of emergency situations. These are general instructions for a healthy lifestyle:    No smoking/ No tobacco products/ Avoid exposure to second hand smoke  Surgeon General's Warning:  Quitting smoking now greatly reduces serious risk to your health.     Obesity, smoking, and sedentary lifestyle greatly increases your risk for illness    A healthy diet, regular physical exercise & weight monitoring are important for maintaining a healthy lifestyle    You may be retaining fluid if you have a history of heart failure or if you experience any of the following symptoms:  Weight gain of 3 pounds or more overnight or 5 pounds in a week, increased swelling in our hands or feet or shortness of breath while lying flat in bed. Please call your doctor as soon as you notice any of these symptoms; do not wait until your next office visit. Patient armband removed and shredded    The discharge information has been reviewed with the patient and caregiver. The patient and caregiver verbalized understanding. Discharge medications reviewed with the patient and caregiver and appropriate educational materials and side effects teaching were provided.   ___________________________________________________________________________________________________________________________________

## 2022-04-18 NOTE — INTERVAL H&P NOTE
Update History & Physical    The Patient's History and Physical of April 18,   Chart was reviewed with the patient and I examined the patient. There was no change. The surgical site was confirmed by the patient and me. Plan:  The risk, benefits, expected outcome, and alternative to the recommended procedure have been discussed with the patient. Patient understands and wants to proceed with the procedure.     Electronically signed by Radha Max MD on 4/18/2022 at 9:42 AM

## 2022-04-18 NOTE — OP NOTES
OPERATIVE NOTE    Patient: Steven Penaloza MRN: [id number]  SSN: [id number]    YOB: 1950  Age: 70 y.o. Sex: male      Indications: This is a 70y.o. year-old male who presents with back and leg pain Left more than right. He was positive for DDD/Stenosis. The patient was admitted for surgery as conservative measures have failed. Date of Procedure: 4/18/2022     Preoperative Diagnosis: LUMBAR STENOSIS    Postoperative Diagnosis: LUMBAR STENOSIS      Procedure: Procedure(s):  LUMBAR 5-SACRAL 1 LAMINECTOMY W/C-ARM    Surgeon: Julissa Paz DO,Surgical Assistant: Jakcson Varghese PA-C    Assistant: Patricio Cranker: Chula Whitlock RN  Circ-Relief: Sussy Pearson RN  Physician Assistant: Jam Botello PA-C  Radiology Technician: Oren Johnson  Scrub Tech-1: David Monreal  Surg Asst-1: Danita Bui    Anesthesia: Izora Osgood    Estimated Blood Loss: 100cc    Specimens: * No specimens in log *     Drains: none    Implants: * No implants in log *    Complications: None; patient tolerated the procedure well. Procedure: The patent was greeted by anesthesia and taken to the operative suite where the patient underwent general endotracheal anesthesia. The patient was positioned in the prone position on a standard radiolucent Anson spine table. A villareal catheter was placed. The lumbar spine was sterilely prepped and draped in the standard fashion. Flouroscopy confirmed the appropriate segments and an incision was made over the posterior spinous processes. The paraspinal musculature was elevated with electrocautery. Complete or partial spinous process resections were performed at  L-5-S1. A high speed bur was utilized to outline the laminectomy site and a complete decompressive lumbar laminectomy was subsequently performed secondary to spinal stenosis. Complete or partial laminectomy was performed at  L-5-S1. .  Aggressive medial facetectomies were performed at all levels with partial or complete facetectomies as needed to decompress the exiting spinal nerve roots. A Foraminotomy was performed over each exiting nerve root to assure neurologic decompression. The pars were left intact to prevent post-laminectomy instability. At the completion of the decompression there was no evidence of residual neurological encroachment at any level. The tissues were irrigated with 1000 ml of sterile saline with bacitracin utilizing pulsatile lavage. All bleeding was cauterized with bipolar electrocautery or gel-foam hemostatic agents. A deep hemovac drain was placed. The fascia was re-approximated with 1-0 Vicryl, the skin edges were re-approximated with 2-0 Vicryl and the skin was closed with a running 3-0 Stratofix. A layer of Dermabond Prineo skin sealant was placed as well as a Mepalex dressing. My Physician Assistant was utilized as a co-surgeon during this case to include dissection, suction, nerve root retraction, Dural retraction, irrigation, and final closure of surgical incision. This assistance was instrumental to the safety and success of this surgical case. The patient recovered from anesthesia and was transferred to the post-anesthesia care unit in stable condition.   Angus Duff MD  4/18/2022  12:43 PM

## 2022-04-18 NOTE — PERIOP NOTES
Discharge instructions reviewed with patient and family. Opportunity for questions and answers given. No further questions at this time.    C/O back pain - order for Roxicodone 5 mg po x 1 received from Dr. Brian Segovia

## 2022-04-18 NOTE — ANESTHESIA PREPROCEDURE EVALUATION
Relevant Problems   CARDIOVASCULAR   (+) HTN (hypertension), benign   (+) Murmur, cardiac      ENDOCRINE   (+) Diabetes mellitus (HCC)       Anesthetic History   No history of anesthetic complications            Review of Systems / Medical History  Patient summary reviewed, nursing notes reviewed and pertinent labs reviewed    Pulmonary  Within defined limits            Pertinent negatives: No sleep apnea and smoker     Neuro/Psych   Within defined limits           Cardiovascular    Hypertension: well controlled              Exercise tolerance: >4 METS     GI/Hepatic/Renal     GERD: well controlled      Hiatal hernia     Endo/Other    Diabetes: poorly controlled, type 2    Arthritis     Other Findings              Physical Exam    Airway  Mallampati: II  TM Distance: > 6 cm  Neck ROM: normal range of motion   Mouth opening: Normal     Cardiovascular    Rhythm: regular  Rate: normal         Dental    Dentition: Caps/crowns  Comments: Missing crown   Pulmonary  Breath sounds clear to auscultation               Abdominal  GI exam deferred       Other Findings            Anesthetic Plan    ASA: 2  Anesthesia type: general          Induction: Intravenous  Anesthetic plan and risks discussed with: Patient

## 2022-04-19 ENCOUNTER — HOME CARE VISIT (OUTPATIENT)
Dept: SCHEDULING | Facility: HOME HEALTH | Age: 72
End: 2022-04-19
Payer: COMMERCIAL

## 2022-04-19 VITALS
RESPIRATION RATE: 18 BRPM | DIASTOLIC BLOOD PRESSURE: 78 MMHG | SYSTOLIC BLOOD PRESSURE: 142 MMHG | TEMPERATURE: 98 F | OXYGEN SATURATION: 97 % | HEART RATE: 80 BPM

## 2022-04-19 PROCEDURE — 400013 HH SOC

## 2022-04-19 PROCEDURE — 400018 HH-NO PAY CLAIM PROCEDURE

## 2022-04-19 PROCEDURE — G0299 HHS/HOSPICE OF RN EA 15 MIN: HCPCS

## 2022-04-20 ENCOUNTER — HOME CARE VISIT (OUTPATIENT)
Dept: HOME HEALTH SERVICES | Facility: HOME HEALTH | Age: 72
End: 2022-04-20
Payer: COMMERCIAL

## 2022-04-20 ENCOUNTER — HOME CARE VISIT (OUTPATIENT)
Dept: SCHEDULING | Facility: HOME HEALTH | Age: 72
End: 2022-04-20
Payer: COMMERCIAL

## 2022-04-20 VITALS
TEMPERATURE: 99.4 F | SYSTOLIC BLOOD PRESSURE: 141 MMHG | HEART RATE: 77 BPM | DIASTOLIC BLOOD PRESSURE: 70 MMHG | OXYGEN SATURATION: 96 % | RESPIRATION RATE: 16 BRPM

## 2022-04-20 PROCEDURE — G0151 HHCP-SERV OF PT,EA 15 MIN: HCPCS

## 2022-04-20 NOTE — Clinical Note
Abundio Casey is a 70 y.o. male referred s/p lumbar laminectomy L5-S1 4/18. Plan: lumbar radiculopathy LLE with spinal stenosis, DM2, HLD, prostate ca and heart murmur. His next f/u with Dr. Juliana Asif 5/3. Plan: 2W2, 1W1.    Therapy Functional Score Assessment  Question   Score   Grooming  2       Upper Dressing 2      Lower Dressing 3      Bathing  5      Toilet Transfer  1    Transfer  2            Ambulation  3   Dyspnea                     2       Pain Interfering with activity 3  Est number therapy visits      5

## 2022-04-21 NOTE — HOME HEALTH
S:Patient stated he was doing well, back pain but no LLE pain or numbness   O: PAIN: see pain tab   WOUND: lumbar incision covered in non-removable dressing, swelling noted but no s/s of infection to surrounding areas. ROM: lumbar ROM limited due to lumbar precautions   STRENGTH:Patient demonstrates decreased muscle strength as follows:  R hip flex 3/5  R hip abd 3/5  R hip add 3/5  R hip ext 3/5  R knee flex 3/5  R knee ext 3/5  R ankle DF 3/5  L hip flex 3/5  L hip abd 3/5  L hip ext 3/5  L hip add 3/5  L knee flex 3/5  L knee ext 3/5  L ankle DF 3/5  pain with seated TKE R>L   BED MOBILITY: Min A, patient able to teach back log roll technique   EQUIPMENT: back brace to be worn when up and walking  TRANSFERS: patient requires Min A from couch and uses BUE to push up and significant support of back of legs on couch for support and balance upon standing, has a wide base of support and requires AD for initial standing balance. GAIT: patient ambulating 20 ft with CGA and no AD. Patient demonstrates the following gait deficits: decreased stance time time and step length LLE, antlagic gait with no arm swing. Patient requires VC's for standing for a few seconds before walking to improve safety and decrease risk of falling. STEPS: there are/is 4 to enter home. Steps not assessed this visit. BALANCE: Tinetti 14/28 and TUG 35 seconds - high fall risk due to reduced strength, gait deviations and decreased efficiency of balance reactions and pain. Patient demonstrates poor use and activation of ankle and hip strategy during standing balance testing and activities. A:ASSESSMENT AND PROGRESS TOWARD GOALS:  Patient demonstrated a positive result to therapy this date as evidenced by an improvement in gait pattern with cues, an understanding of his fall risk and agreement to not walk alone,  and patient expressing an understanding of the rehab plan due to therapy verbal and written instructions.  Goals established for increased independence in the home, safe mobility in the home, improvement in strength and balance all designed to reduce fall risk and progress toward independence. Patient will benefit from continued PT intervention to progress toward meeting all established goals     P: 2W2, 1W1          PMH/Summary of clinical condition: Airam Davalos is a 70 y.o. male referred s/p lumbar laminectomy L5-S1 4/18. Plan: lumbar radiculopathy LLE with spinal stenosis, DM2, HLD, prostate ca and heart murmur. His next f/u with Dr. Miguel Ángel Beltran 5/3. Medications reconciled. No changes in medications at this time. Medications are effective at this time. Social history/ caregivers: lives with his wife in a two story home with 4 steps to enter and 12 to second floor master. He requires assistance from his wife and daughters for ADL's, IADL's, medication management and transportation. Pt/Caregiver instructed on plan of care and are agreeable to plan of care at this time. Plan of care and admission to home health status reported to attending physician: Dr. Miguel Ángel Beltran  Discharge planning discussed with patient and caregiver. Discharge planning as follows: DC to self and family under MD supervision when all goals are met or maximum potential is reached  Pt/Caregiver did verbalize understanding. Patient education provided this visit: safety, fall risk, HEP, need for assistance at all times with transfers and ambulation  Home exercise program: 1. always have someone with her for assistance when transferring or ambulating   2. stand and walk short distances every hour during the day to increase strength, provide pressure relief and increase independence with transfers  3. Patient/CG instructed in a  HEP as follows: ankle pumps and deep breathing x 10 reps every hour. Pt instructed to increased walking time and distance daily.   Patient's response to treatment: Patient had some increased pain with transitional movement but better when up and walking. Patient's response to education provided: Patient was able to teach back lumbar precautions to include wearing back brace at all times, no BLT (bending, lifting and twisting)  Continued need for the following skills: MD referral for HHPT following recent hospital stay. HHPT is medically necessary to address  dx and clinical findings: decreased ROM, decreased strength, impaired gait, decreased ability w stair negotiation, increased swelling, decreased transfer status, decreased endurance, decreased balance and decreased safety, increased pain in order to improve functional mobility/quality of life, decrease burden of care, reduce risk for re-hospitalization, work towards patient's personal goals of return to PLOF w decrease risk for falls.

## 2022-04-22 ENCOUNTER — HOME CARE VISIT (OUTPATIENT)
Dept: SCHEDULING | Facility: HOME HEALTH | Age: 72
End: 2022-04-22
Payer: COMMERCIAL

## 2022-04-22 VITALS
RESPIRATION RATE: 17 BRPM | DIASTOLIC BLOOD PRESSURE: 78 MMHG | SYSTOLIC BLOOD PRESSURE: 140 MMHG | HEART RATE: 74 BPM | TEMPERATURE: 98.4 F | OXYGEN SATURATION: 98 %

## 2022-04-22 PROCEDURE — G0157 HHC PT ASSISTANT EA 15: HCPCS

## 2022-04-22 PROCEDURE — G0299 HHS/HOSPICE OF RN EA 15 MIN: HCPCS

## 2022-04-22 PROCEDURE — A6219 GAUZE <= 16 SQ IN W/BORDER: HCPCS

## 2022-04-22 NOTE — HOME HEALTH
Skilled reason for visit: lumbar lami l5-s1 requiring dressing changes. Caregiver involvement: Patient's cg is his wife/daughters. cg lives with patient and is available as needed for assistance with iadls, adls, meal prep, medication management, taking to md appointments. Medications reviewed and all medications are available in the home this visit. No reported medication changes on this visit. The following education was provided regarding medications, medication interactions, and look a like medications: reviewed side effects, purposes, dosage, frequencies. Medications  are effective at this time. Home health supplies by type and quantity ordered/delivered this visit include: SN to check if dressings ordered on admission. provided dry dressing today. Patient education provided this visit: patient/cg instructed to monitor for edema/increase in edema, to elevate extremity when edema occurs and to notify md if edema exceeds normal limits for patient. pt aware to keep dressing clean, dry and intact as ordered. patient made aware to monitor for s/s of infection [increased swelling, increased redness around site, increased pain, foul smelling drainage, fever] aware who to report to/when. pt instructed to follow a high protein diet for healing- to try to get 90g protein daily. encouraged patient to get three nutritional meals daily and to stay hydrated, drink plenty of fluids. discussed fall precautions in detail- having lighted hallways, removing throw rugs, monitoring medication that may alter mental status. Sharps education provided: Clinician reminded patient/cg on proper disposal of sharps as follows: Containers should be made of hard plastic, be puncture-resistant and leakproof, such as a laundry detergent or bleach bottle.  When the container is ¾ full, it should be sealed with tape and labeled. DO NOT RECYCLE prior to discarding in the regular trash.   Patient level of understanding of education provided: patient has a good understanding of education that was provided at this time by engaging in all education provided and is able to verbalize understanding, pt denies any questions or concerns at this time. Skilled Care Performed this visit: dressing to lower back changed today- old dressing removed (bandaid pt applied yesterday due to wanting to check the incision) incision healing well with no s/s of infection. SN placed dry dressing. SN instructed patient to not remove dressing if intact, to call staff if any issues occur with dressings and needing to be changed. Patient response to procedure performed:   patient tolerated procedure with no signs of discomfort, grimacing or pain, no complications or concerns noted. Progress toward goals: goals/teaching reviewed. patient is progressing towards goals at this time, skilled need to continue monitoring incision and for dressing changes. Home exercise program: patient instructed to perform sob hep 4-5 x daily and prn for sob, to promote lung expansion. pt also encouraged to use ICS q 2 hours and to perform sob hep during therapy. Continued need for the following skills: Nursing and Physical Therapy    The following discharge planning was discussed with the pt/caregiver: Patient will be discharged once education has completed, patient is medically stable and pt/cg are able to independently manage dressing changes/ incisional care.

## 2022-04-23 NOTE — HOME HEALTH
Skilled services/Home bound verification:     Skilled Reason for admission/summary of clinical condition:  Patient with lower back pain had laminectomy. He states that his only pain is when he stands from sitting position, but he feels fine when sitting or standing. Patient lives with wife and two daughters visit to help every day and as needed. This patient is homebound for the following reasons Requires considerable and taxing effort to leave the home , Requires the assistance of 1 or more persons to leave the home  and Only leaves the home for medical reasons or [employer] services and are infrequent and of short duration for other reasons . Caregiver: relative. Caregiver assists with  ADL's, house keeping, transportation, meal prep. .    Medications reconciled and all medications are available in the home this visit. The following education was provided regarding medications: All medications educated on  and reconciled. All medications prescribed are in the home and patient taking as prescribed. Common uses and side effects reviewed with patient    Medications  are effective at this time. High risk medication teaching regarding anticoagulants, hyperglycemic agents or opiod narcotics performed (specify)   Glipizide: This drug may cause cutaneous hypersensitivity, diarrhea, nausea, or headache. Advise patient to immediately report signs/symptoms of Dalal-James syndrome (flu-like symptoms, spreading red rash, or skin/mucous membrane blistering). Instruct patients to monitor for signs/symptoms of hyper- or hypoglycemia and to report difficulties with glycemic control, especially during times of stress caused by infection, fever, trauma, or surgery. Patient should take tablet 30 min before a meal, preferably breakfast.   Patient should not drink alcohol while taking this drug.   Glipizide:   Patient should avoid activities requiring coordination until drug effects are realized, as drug may cause dizziness. Instruct patient to report symptoms of lactic acidosis . Warn premenopausal anovulatory woman that ovulation and subsequent pregnancy may occur . Side effects may include diarrhea, dyspepsia, flatulence, nausea, vomiting, headache, increased sweating, asthenia, or unpleasant metallic taste . Tell patient to take immediate-release tablets and solution with meals and extended-release tablets and suspension with the evening meal . Advise patient to avoid excessive amounts of alcohol .      hydrocodone/apap: Advise patient to report symptoms of respiratory depression . Tell patients to report symptoms of serotonin syndrome . Advise patient to report symptoms of adrenal insufficiency . Instruct patient to report symptoms of hypotension, orthostatic hypotension, or syncope . This drug may cause dizziness, lightheadedness, nausea, vomiting, sedation, constipation, urinary retention, skin rash, mental clouding, or lethargy . Instruct patient should avoid activities requiring mental alertness or coordination until drug effects are realized . Advise patient to report symptoms of constipation . Advise patient to avoid alcohol or other CNS depressants . Instruct patient that it is unsafe to take more than 4 g of acetaminophen in a 24-hour period and to avoid nonprescription products with acetaminophen . Metformin: Instruct patient to report symptoms of lactic acidosis . Side effects may include diarrhea, dyspepsia, flatulence, nausea, vomiting, headache, increased sweating, asthenia, or unpleasant metallic taste . Tell patient to take immediate-release tablets and solution with meals and extended-release tablets and suspension with the evening meal . Advise patient to avoid excessive amounts of alcohol . No discrepancies/look a like medications/medication interactions.      Home health supplies by type and quantity ordered/delivered this visit include: none this visit    Patient education provided this visit to include: Pain contol, fall prevention, hh orientation, diabetic education . Patient level of understanding of education provided: Patient states that she understands all education      Sharps Education Provided: Clinician instructed patient/CG on proper disposal of sharps: Containers should be made of hard plastic, be puncture-resistant and leakproof,   such as a laundry detergent or bleach bottle.  When the container is ¾ full, it should be sealed with tape and labeled   DO NOT RECYCLE prior to discarding in the regular trash.      Patient response to procedure performed:  Patient responded well to visit without any increase in pain or discomfort        Home exercise program/Homework provided: will participate with PT    Pt/Caregiver instructed on plan of care and are agreeable to plan of care at this time. Physician Dr. Franklin Worley notified of patient admission to home health and plan of care including anticipated frequency of 2w2 and treatments/interventions/modalities of SN. Discharge planning discussed with patient and caregiver. Discharge planning as follows: Will d/c to home/self care when goals are met and patient is stable  . Pt/Caregiver did verbalize understanding of discharge planning. Next MD appointment 4/28/22 (date) with Dr. Franklin Worley MD/NP/PA. Patient/caregiver encouraged/instructed to keep appointment as lack of follow through with physician appointment could result in discontinuation of home care services for non-compliance.

## 2022-04-25 ENCOUNTER — HOME CARE VISIT (OUTPATIENT)
Dept: SCHEDULING | Facility: HOME HEALTH | Age: 72
End: 2022-04-25
Payer: COMMERCIAL

## 2022-04-25 VITALS
SYSTOLIC BLOOD PRESSURE: 140 MMHG | HEART RATE: 74 BPM | OXYGEN SATURATION: 98 % | TEMPERATURE: 98.3 F | DIASTOLIC BLOOD PRESSURE: 76 MMHG

## 2022-04-25 PROCEDURE — G0157 HHC PT ASSISTANT EA 15: HCPCS

## 2022-04-25 NOTE — HOME HEALTH
SUBJECTIVE: Pain goes up when I sit back. CAREGIVER INVOLVEMENT/ASSISTANCE NEEDED FOR: Cheryle Blush, wife, assist with cooking  [address] ORDERED/DELIVERED THIS VISIT INCLUDE: none  OBJECTIVE:  See interventions. ASSESSMENT OF PROGRESS TOWARD GOALS: Pt progressing with ther-ex, VC to perform full ROM with ther-ex.     PATIENT RESPONSE TO TREATMENT:  Pt   PATIENT LEVEL OF UNDERSTANDING OF EDUCATION PROVIDED: Pt educated on HEP to perform daily, Patient understands wiling to comply  CONTINUED NEED FOR THE FOLLOWING SKILLS: CONT PT to address MMT, ROM, bed mob, transfers,   PLAN FOR NEXT VISIT: Progress with ther-ex and increasing ambulation  THE FOLLOWING DISCHARGE PLANNING WAS DISCUSSED WITH THE PATIENT/CAREGIVER: CONT PT 2wk3, 1wk1

## 2022-04-26 ENCOUNTER — HOME CARE VISIT (OUTPATIENT)
Dept: SCHEDULING | Facility: HOME HEALTH | Age: 72
End: 2022-04-26
Payer: COMMERCIAL

## 2022-04-26 VITALS
DIASTOLIC BLOOD PRESSURE: 77 MMHG | TEMPERATURE: 97.5 F | OXYGEN SATURATION: 96 % | HEART RATE: 78 BPM | SYSTOLIC BLOOD PRESSURE: 130 MMHG

## 2022-04-26 VITALS
RESPIRATION RATE: 16 BRPM | DIASTOLIC BLOOD PRESSURE: 72 MMHG | SYSTOLIC BLOOD PRESSURE: 140 MMHG | OXYGEN SATURATION: 98 % | TEMPERATURE: 97.6 F | HEART RATE: 75 BPM

## 2022-04-26 PROCEDURE — G0299 HHS/HOSPICE OF RN EA 15 MIN: HCPCS

## 2022-04-26 NOTE — HOME HEALTH
Norrine Medal is doing better  CAREGIVER INVOLVEMENT/ASSISTANCE NEEDED FOR: Arthur Ragsdale, wife, assist with [facility] ORDERED/DELIVERED THIS VISIT INCLUDE: none  OBJECTIVE:  See interventions. ASSESSMENT OF PROGRESS TOWARD GOALS: Pt progressing with gait training outside on uneven terrain, VC for safe ambulation. PATIENT RESPONSE TO TREATMENT:  Pt pain increased from 2 to 3 with ther-ex. Resolved with 2 min of rest.  PATIENT LEVEL OF UNDERSTANDING OF EDUCATION PROVIDED: Pt educated on outside ambulation on grass.   CONTINUED NEED FOR THE FOLLOWING SKILLS: CONT PT to address MMT, ROM, bed mob, transfers and other goals  PLAN FOR NEXT VISIT: Progress to stair training next visit  THE FOLLOWING DISCHARGE PLANNING WAS DISCUSSED WITH THE PATIENT/CAREGIVER: CONT PT 1wk1, 2wk2, 1wk1

## 2022-04-27 ENCOUNTER — HOME CARE VISIT (OUTPATIENT)
Dept: SCHEDULING | Facility: HOME HEALTH | Age: 72
End: 2022-04-27
Payer: COMMERCIAL

## 2022-04-27 PROCEDURE — G0157 HHC PT ASSISTANT EA 15: HCPCS

## 2022-04-27 NOTE — HOME HEALTH
Skilled reason for visit: dressing change to back     Caregiver involvement: daughter is in town to help and chadd lives with wife . Medications reviewed and all medications are available in the home this visit. The following education was provided regarding medications:  all medications in home and patient is taking them all . MD notified of any discrepancies/look a-like medications/medication interactions: none today   Medications are reconciled  at this time.       Home health supplies by type and quantity ordered/delivered this visit include: none    Patient education provided this visit: SN educated on s/s to report to MD, when to take pain pills, using ice to help with pain     Sharps education provided: NA    Patient level of understanding of education provided: patient verbalized understanding and stated that he is using ice a couple times a day    Skilled Care Performed this visit: incision care     Patient response to procedure performed:  patient had no compliants     Patient's Progress towards personal goals: chadd stated that he is feeling well and doing great     Home exercise program: breathing techqniues     Continued need for the following skills: Nursing and Physical Therapy    Plan for next visit: incision care     Patient and/or caregiver notified and agrees to changes in the Plan of Care N/A      The following discharge planning was discussed with the pt/caregiver: when goals met and education is compelte

## 2022-04-28 VITALS
DIASTOLIC BLOOD PRESSURE: 80 MMHG | OXYGEN SATURATION: 97 % | SYSTOLIC BLOOD PRESSURE: 135 MMHG | HEART RATE: 89 BPM | TEMPERATURE: 97.3 F

## 2022-04-28 NOTE — HOME HEALTH
SUBJECTIVE:  I am trying to move as much as I can. CAREGIVER INVOLVEMENT/ASSISTANCE NEEDED FOR: Mauricio Quiles, wife, assist with household chores. HOME HEALTH SUPPLIES BY TYPE AND QUANTITY ORDERED/DELIVERED THIS VISIT INCLUDE: none  OBJECTIVE:  See interventions. ASSESSMENT OF PROGRESS TOWARD GOALS: Pt able to perform stair training with use of handrail. PATIENT RESPONSE TO TREATMENT:  Pt fatigued with gait training and stair training, needing 2 min rest period. PATIENT LEVEL OF UNDERSTANDING OF EDUCATION PROVIDED: Pt educated on stair training using handrail. Pt able to repeat back instructions. CONTINUED NEED FOR THE FOLLOWING SKILLS: CONT PT to address MMT, ROM, bed mob, transfers and other goals  PLAN FOR NEXT VISIT: Progress with amb endurance and increasing endurance.   THE FOLLOWING DISCHARGE PLANNING WAS DISCUSSED WITH THE PATIENT/CAREGIVER: CONT PT 1wk1

## 2022-04-29 ENCOUNTER — HOME CARE VISIT (OUTPATIENT)
Dept: SCHEDULING | Facility: HOME HEALTH | Age: 72
End: 2022-04-29
Payer: COMMERCIAL

## 2022-04-29 VITALS
HEART RATE: 84 BPM | OXYGEN SATURATION: 98 % | SYSTOLIC BLOOD PRESSURE: 132 MMHG | RESPIRATION RATE: 16 BRPM | TEMPERATURE: 97.8 F | DIASTOLIC BLOOD PRESSURE: 88 MMHG

## 2022-04-29 PROCEDURE — G0299 HHS/HOSPICE OF RN EA 15 MIN: HCPCS

## 2022-05-03 ENCOUNTER — HOME CARE VISIT (OUTPATIENT)
Dept: SCHEDULING | Facility: HOME HEALTH | Age: 72
End: 2022-05-03
Payer: COMMERCIAL

## 2022-05-03 PROCEDURE — G0151 HHCP-SERV OF PT,EA 15 MIN: HCPCS

## 2022-05-03 NOTE — Clinical Note
Gustavo Giron received skilled PT and RN s/p L lumbar laminectomy. This patient has currently met all goals and has been discharged to a [facility] under the care and supervision of this surgeon and family at this time. The following goals have been met: TUG 14 seconds, Tinetti 28/28 and pt is impendent with ambulating >300ft and with all transfers. RN goals met for wound care, post-op instructional care and medication management. Patient verbalized understanding of all discharge instructions and is in agreement with discharge this visit.

## 2022-05-04 VITALS
TEMPERATURE: 98.4 F | OXYGEN SATURATION: 98 % | SYSTOLIC BLOOD PRESSURE: 127 MMHG | DIASTOLIC BLOOD PRESSURE: 74 MMHG | RESPIRATION RATE: 16 BRPM | HEART RATE: 72 BPM

## 2022-05-04 NOTE — HOME HEALTH
S: Patient stated he was doing well and released by his surgeon to return to work next week. O: PAIN: Pain is well controlled and not interferring with function at this time. WOUND: Lumbar incision was open to air, clean and healing w/o s/s of infection noted. ROM: ROM limited by lumbar precautions   STRENGTH:Patient demonstrates decreased muscle strength as follows:  R hip flex 5/5  R hip abd 5/5  R hip add 5/5  R hip ext 5/5  R knee flex 5/5  R knee ext 5/5  R ankle DF 5/5  L hip flex 5/5  L hip abd 5/5  L hip ext 5/5  L hip add 5/5  L knee flex 5/5  L knee ext 5/5  L ankle DF 5/5   BED MOBILITY: independent   EQUIPMENT: pt to wear back brace when outdoors per MD  TRANSFERS: independent  GAIT: >300 ft without an AD and good reciprocal gait pattern  STEPS: independent to second floor master and to exit the home   BALANCE: Tinetti 28/28 and TUG 14 seconds - pt has been free from falls   A:ASSESSMENT AND PROGRESS TOWARD GOALS:  Deepika Nichole received skilled PT and RN s/p L lumbar laminectomy. This patient has currently met all goals and has been discharged to a [facility] under the care and supervision of this surgeon and family at this time. The following goals have been met: TUG 14 seconds, Tinetti 28/28 and pt is impendent with ambulating >300ft and with all transfers. RN goals met for wound care, post-op instructional care and medication management. Patient verbalized understanding of all discharge instructions and is in agreement with discharge this visit. P: DC    1. Discharge medication list reconciled with patient and caregiver. Questions regarding medications answered and patient/caregiver advised to refer to MD for any medication questions after discharge. 2. Patient to continue use of the following assistive device for maximum safety: back brace  3. Today's treatment included: review of therapeutic exercise program, reassessment of mobility, transfers, balance and gait.  Patient expressed understanding of his daily HEP and lumbar precautions  4. Patient and caregiver demonstrate understanding of DC instructions and repeat verbalization. Patient and caregiver given written copy of instructions. 5. Patient and caregiver given notification of discharge and in agreement with DC this date. 6. MD notified of discharge.

## (undated) DEVICE — PREP SKN CHLRAPRP APL 26ML STR --

## (undated) DEVICE — 5.0MM COARSE DIAMOND

## (undated) DEVICE — 1010 S-DRAPE TOWEL DRAPE 10/BX: Brand: STERI-DRAPE™

## (undated) DEVICE — KIT POS W/ FOAM ARM CRADL SHEARGUARD CHST PD CVR FOR SPNL

## (undated) DEVICE — SOLUTION LACTATED RINGERS INJECTION USP

## (undated) DEVICE — GARMENT,MEDLINE,DVT,INT,CALF,MED, GEN2: Brand: MEDLINE

## (undated) DEVICE — GLOVE SURG SZ 65 L12IN FNGR THK79MIL GRN LTX FREE

## (undated) DEVICE — DERMABOND SKIN ADH 0.7ML --

## (undated) DEVICE — HANDPIECE SET WITH HIGH FLOW TIP AND SUCTION TUBE: Brand: INTERPULSE

## (undated) DEVICE — GLOVE SURG SZ 7 L12IN FNGR THK79MIL GRN LTX FREE

## (undated) DEVICE — SUTURE STRATAFIX SZ 3-0 L30CM NONABSORBABLE UD L19MM FS-2 SXMP2B408

## (undated) DEVICE — CATH IV AUTOGRD ORN 14GA 45MM -- INSYTE-N

## (undated) DEVICE — POWDER HEMOSTAT GEL 1GR --

## (undated) DEVICE — SUTURE VCRL + SZ 2-0 L18IN ABSRB VLT CT-2 1/2 CIR TAPERCUT VCP726D

## (undated) DEVICE — GLOVE SURG SZ 9 THK91MIL LTX FREE SYN POLYISOPRENE ANTI

## (undated) DEVICE — SUTURE VCRL + SZ 1 L18IN ABSRB UD L36MM CT-1 1/2 CIR VCP841D

## (undated) DEVICE — GLOVE SURG SZ 65 CRM LTX FREE POLYISOPRENE POLYMER BEAD ANTI

## (undated) DEVICE — SOL IRRIGATION INJ NACL 0.9% 500ML BTL

## (undated) DEVICE — NDL SPNE QNCKE 18GX3.5IN LF --

## (undated) DEVICE — OPTIFOAM GENTLE LITE, BORDERED, 4X4: Brand: MEDLINE

## (undated) DEVICE — PACK PROCEDURE SURG LAMINECTOMY SPINE CUST

## (undated) DEVICE — CORD BPLR L12FT DISP